# Patient Record
Sex: FEMALE
[De-identification: names, ages, dates, MRNs, and addresses within clinical notes are randomized per-mention and may not be internally consistent; named-entity substitution may affect disease eponyms.]

---

## 2022-03-02 PROBLEM — Z00.00 ENCOUNTER FOR PREVENTIVE HEALTH EXAMINATION: Status: ACTIVE | Noted: 2022-03-02

## 2022-03-22 ENCOUNTER — NON-APPOINTMENT (OUTPATIENT)
Age: 57
End: 2022-03-22

## 2022-03-22 ENCOUNTER — APPOINTMENT (OUTPATIENT)
Dept: ELECTROPHYSIOLOGY | Facility: CLINIC | Age: 57
End: 2022-03-22
Payer: COMMERCIAL

## 2022-03-22 VITALS
DIASTOLIC BLOOD PRESSURE: 84 MMHG | HEART RATE: 101 BPM | BODY MASS INDEX: 30.21 KG/M2 | WEIGHT: 160 LBS | SYSTOLIC BLOOD PRESSURE: 133 MMHG | HEIGHT: 61 IN | OXYGEN SATURATION: 100 %

## 2022-03-22 DIAGNOSIS — Z78.9 OTHER SPECIFIED HEALTH STATUS: ICD-10-CM

## 2022-03-22 DIAGNOSIS — Z82.41 FAMILY HISTORY OF SUDDEN CARDIAC DEATH: ICD-10-CM

## 2022-03-22 DIAGNOSIS — I49.3 VENTRICULAR PREMATURE DEPOLARIZATION: ICD-10-CM

## 2022-03-22 PROCEDURE — 99204 OFFICE O/P NEW MOD 45 MIN: CPT

## 2022-03-22 PROCEDURE — 93000 ELECTROCARDIOGRAM COMPLETE: CPT

## 2022-03-22 RX ORDER — ADHESIVE TAPE 3"X 2.3 YD
50 MCG TAPE, NON-MEDICATED TOPICAL DAILY
Refills: 0 | Status: ACTIVE | COMMUNITY
Start: 2022-03-22

## 2022-03-22 RX ORDER — PHENOL 1.4 %
600-400 AEROSOL, SPRAY (ML) MUCOUS MEMBRANE
Refills: 0 | Status: ACTIVE | COMMUNITY
Start: 2022-03-22

## 2022-03-22 RX ORDER — MULTIVIT-MIN/FOLIC/VIT K/LYCOP 400-300MCG
1000 TABLET ORAL DAILY
Refills: 0 | Status: ACTIVE | COMMUNITY
Start: 2022-03-22

## 2022-03-22 RX ORDER — METOPROLOL SUCCINATE 50 MG/1
50 TABLET, EXTENDED RELEASE ORAL DAILY
Qty: 30 | Refills: 3 | Status: ACTIVE | COMMUNITY
Start: 2022-03-22

## 2022-03-22 RX ORDER — ANASTROZOLE TABLETS 1 MG/1
1 TABLET ORAL DAILY
Refills: 0 | Status: ACTIVE | COMMUNITY
Start: 2022-03-22

## 2022-03-22 RX ORDER — ATORVASTATIN CALCIUM 10 MG/1
10 TABLET, FILM COATED ORAL
Qty: 90 | Refills: 1 | Status: ACTIVE | COMMUNITY
Start: 2022-03-22

## 2022-03-27 PROBLEM — Z82.41 FH: SUDDEN CARDIAC DEATH (SCD): Status: ACTIVE | Noted: 2022-03-27

## 2022-03-27 PROBLEM — Z78.9 NON-SMOKER: Status: ACTIVE | Noted: 2022-03-27

## 2022-03-27 NOTE — PHYSICAL EXAM
[Well Developed] : well developed [Well Nourished] : well nourished [No Acute Distress] : no acute distress [Normal Conjunctiva] : normal conjunctiva [Normal Venous Pressure] : normal venous pressure [Normal S1, S2] : normal S1, S2 [No Murmur] : no murmur [No Rub] : no rub [No Gallop] : no gallop [Clear Lung Fields] : clear lung fields [Good Air Entry] : good air entry [No Respiratory Distress] : no respiratory distress  [Non Tender] : non-tender [Soft] : abdomen soft [Normal Bowel Sounds] : normal bowel sounds [Normal Gait] : normal gait [Gait - Sufficient for Exercise Testing] : gait - sufficient for exercise testing [No Cyanosis] : no cyanosis [No Edema] : no edema [No Rash] : no rash [Moves all extremities] : moves all extremities [Normal Speech] : normal speech [No Focal Deficits] : no focal deficits [Alert and Oriented] : alert and oriented [Normal memory] : normal memory

## 2022-03-28 PROBLEM — I49.3 VPC'S (VENTRICULAR PREMATURE COMPLEXES): Status: ACTIVE | Noted: 2022-03-16

## 2022-03-28 NOTE — CARDIOLOGY SUMMARY
[de-identified] : today:\par SR @ 89bpm; frequent PVCs [de-identified] : Holter:\par 7.8% PVC burden\par 88% similar morphology, 12% alternate morphology [de-identified] : 12/2021:\par No ischemia [de-identified] : 3/2/22:\par EF 55%. Mild MR. [de-identified] : cMRI 3/2/22:\par EF 55%. Non-specific scar in the mid inferoseptum at the site of the RV insertion

## 2022-03-28 NOTE — HISTORY OF PRESENT ILLNESS
[FreeTextEntry1] : I had the pleasure of seeing Johanna Cary today for consultation for PVCs in the arrhythmia clinic at [facility]. As you well know, she is a pleasant 56 year old woman with a history of HTN, breast cancer s/p bilateral mastectomy, vulvar cancer resection and PVCs. Patient is asymptomatic and denies chest pain, shortness of breath, palpitations, near syncope or syncope. She was noted with PVCs on a routine EKG on her annual check-up with her PCP. She reports that her older sister who is ~60 years old had a cardiac arrest last fall after working out at the gym. She states that her sister wasvery active and did intense gym workouts. The day she had an arrest, she had just completed a boot camp workout. Her sister was admitted at [facility] initially and underwent a cardiac catheterization with POBA distal LAD. Her EF initially was 5-10% but improved to 34%. Her sister's work up also include a cardiac MRI which was unrevealing and genetic testing which the patient reports was inconclusive. Her sister's ECG initially had a long QT which may be a resultant of the arrest but normalized. \par \par Patient had a holter performed (the actual tracings we do not have yet to review) which reports a 7.8% PVC burden with 88% similar morphology and 12% alternate morphology. She also had an Echocardiogram which showed normal LV function. A cardiac MRI showed an EF 55% with non-specific scar in the mid inferoseptum at the site of the RV insertion.

## 2022-03-28 NOTE — DISCUSSION/SUMMARY
[FreeTextEntry1] : In summary, Ms. Cary is a 56 year old woman with a history of sister who had a recent cardiac arrest, HTN and found with a 7.8% PVC burden. Reportedly on holter monitor she had a predominant morphology, however there was an alternate morphology about 12% of the time. Her cardiac MRI, stress test and Echocardiogram is unrevealing. We discussed the pathophysiology of PVCs including management strategies. We discussed options of medication management versus ablation. Risks and benefits were discussed with each option. Given her family history and two PVC morphologies, we recommend an EP study for risk stratification with possible ablation. After all questions were answered, patient would like to consider her options. She will contact us if she would like to proceed with an EP study and ablation. She will continue her metoprolol.\par

## 2022-06-07 ENCOUNTER — APPOINTMENT (OUTPATIENT)
Dept: ELECTROPHYSIOLOGY | Facility: CLINIC | Age: 57
End: 2022-06-07